# Patient Record
Sex: MALE | Race: WHITE | NOT HISPANIC OR LATINO
[De-identification: names, ages, dates, MRNs, and addresses within clinical notes are randomized per-mention and may not be internally consistent; named-entity substitution may affect disease eponyms.]

---

## 2024-09-18 ENCOUNTER — TRANSCRIBE ORDERS (OUTPATIENT)
Dept: OTHER | Age: 59
End: 2024-09-18

## 2024-09-18 DIAGNOSIS — R25.3 FASCICULATION: Primary | ICD-10-CM

## 2024-09-19 ENCOUNTER — TELEPHONE (OUTPATIENT)
Dept: NEUROLOGY | Facility: CLINIC | Age: 59
End: 2024-09-19

## 2024-09-19 NOTE — TELEPHONE ENCOUNTER
[facility] Call [facility]    Phone Message    May a detailed message be left on voicemail: yes     Reason for Call: Appointment Intake    Referring Provider Name: Quin Nicole MD  Diagnosis and/or Symptoms: Fasciculation [R25.3]    Please review and assist with scheduling- ALS Clinic-Patient does not have any active insurance on file, patient self pay, patient will have EMG completed on 9/20/24    Action Taken: Other: Neurology    Travel Screening: Not Applicable     Date of Service:

## 2024-09-20 ENCOUNTER — TRANSFERRED RECORDS (OUTPATIENT)
Dept: HEALTH INFORMATION MANAGEMENT | Facility: CLINIC | Age: 59
End: 2024-09-20

## 2024-10-25 DIAGNOSIS — M62.81 MUSCLE WEAKNESS (GENERALIZED): Primary | ICD-10-CM

## 2024-10-30 ENCOUNTER — OFFICE VISIT (OUTPATIENT)
Dept: PULMONOLOGY | Facility: CLINIC | Age: 59
End: 2024-10-30

## 2024-10-30 ENCOUNTER — OFFICE VISIT (OUTPATIENT)
Dept: NEUROLOGY | Facility: CLINIC | Age: 59
End: 2024-10-30

## 2024-10-30 VITALS
RESPIRATION RATE: 16 BRPM | SYSTOLIC BLOOD PRESSURE: 175 MMHG | WEIGHT: 207 LBS | HEART RATE: 66 BPM | OXYGEN SATURATION: 95 % | DIASTOLIC BLOOD PRESSURE: 94 MMHG

## 2024-10-30 DIAGNOSIS — M62.81 MUSCLE WEAKNESS (GENERALIZED): ICD-10-CM

## 2024-10-30 DIAGNOSIS — R26.1 SPASTIC GAIT: ICD-10-CM

## 2024-10-30 DIAGNOSIS — G12.21 ALS (AMYOTROPHIC LATERAL SCLEROSIS) (H): ICD-10-CM

## 2024-10-30 DIAGNOSIS — M62.81 MUSCLE WEAKNESS (GENERALIZED): Primary | ICD-10-CM

## 2024-10-30 LAB
EXPTIME-PRE: 7.67 SEC
FEF2575-%PRED-PRE: 148 %
FEF2575-PRE: 4.25 L/SEC
FEF2575-PRED: 2.86 L/SEC
FEFMAX-%PRED-PRE: 85 %
FEFMAX-PRE: 7.83 L/SEC
FEFMAX-PRED: 9.12 L/SEC
FEV1-%PRED-PRE: 118 %
FEV1-PRE: 3.91 L
FEV1FEV6-PRE: 82 %
FEV1FEV6-PRED: 79 %
FEV1FVC-PRE: 81 %
FEV1FVC-PRED: 79 %
FIFMAX-PRE: 4.75 L/SEC
FVC-%PRED-PRE: 115 %
FVC-PRE: 4.81 L
FVC-PRED: 4.18 L
MEP-PRE: 162 CMH2O
MIP-PRE: -133 CMH2O

## 2024-10-30 PROCEDURE — 94375 RESPIRATORY FLOW VOLUME LOOP: CPT | Performed by: INTERNAL MEDICINE

## 2024-10-30 PROCEDURE — 99417 PROLNG OP E/M EACH 15 MIN: CPT | Performed by: PSYCHIATRY & NEUROLOGY

## 2024-10-30 PROCEDURE — 99205 OFFICE O/P NEW HI 60 MIN: CPT | Performed by: PSYCHIATRY & NEUROLOGY

## 2024-10-30 PROCEDURE — 94799 UNLISTED PULMONARY SVC/PX: CPT | Performed by: INTERNAL MEDICINE

## 2024-10-30 RX ORDER — RILUZOLE 50 MG/1
1 TABLET, FILM COATED ORAL EVERY 12 HOURS
COMMUNITY
Start: 2024-10-03

## 2024-10-30 RX ORDER — LOSARTAN POTASSIUM AND HYDROCHLOROTHIAZIDE 12.5; 1 MG/1; MG/1
1 TABLET ORAL DAILY
COMMUNITY
Start: 2024-08-29

## 2024-10-30 RX ORDER — BUSPIRONE HYDROCHLORIDE 5 MG/1
1 TABLET ORAL 2 TIMES DAILY
COMMUNITY
Start: 2024-10-07

## 2024-10-30 RX ORDER — AMLODIPINE BESYLATE 5 MG/1
10 TABLET ORAL
COMMUNITY
Start: 2024-08-29

## 2024-10-30 RX ORDER — CITALOPRAM HYDROBROMIDE 40 MG/1
1 TABLET ORAL DAILY
COMMUNITY
Start: 2024-08-29

## 2024-10-30 RX ORDER — CARBAMAZEPINE 200 MG/1
200 TABLET ORAL
COMMUNITY
Start: 2024-10-03

## 2024-10-30 ASSESSMENT — REVISED AMYOTROPHIC LATERAL SCLEROSIS FUNCTIONAL RATING SCALE (ALSFRS-R)
DRESSING_AND_HYGEINE: 3
RESPIRATORY_SUBTOTAL_SCORE: 12
SALIVATION: NORMAL
SPEECH: 4
TURNING_IN_BED_AND_ADJUSTING_BED_CLOTHES: 3
BULBAR_SUBTOTAL: 12
CUTTING_FOOD_AND_HANDLING_UTENSILES: 4
SALIVATION: 4
HANDWRITING: 3
ORTHOPENA: 4
CLIMBING_STAIRS: 1
DRESSING_AND_HYGEINE: INDEPENDENT AND COMPLETE SELF-CARE WITH EFFORT OR DECREASED EFFICIENCY
WALKING: EARLY AMBULATION DIFFICULTIES
SPEECH: NORMAL SPEECH PROCESSES
HANDWRITING: SLOW OR SLOPPY: ALL WORDS ARE LEGIBLE
DYSPNEA: 4
SWALLOWING: NORMAL EATING HABITS
ALSFRS_TOTAL_SCORE: 41
SWALLOWING: 4
TURNING_IN_BED_AND_ADJUSTING_BED_CLOTHES: SOMEWHAT SLOW AND CLUMSY, BUT NO HELP NEEDED
GROSS_MOTOR_SUBTOTAL_SCORE: 7
FINE_MOTOR_SUBTOTAL_SCORE: 10
SIX_ITEM_SUBTOTAL: 20
RESPIRATORY_INSUFFICIENCY: 4
WALKING: 3
CLIMBING_STAIRS: NEEDS ASSISTANCE

## 2024-10-30 ASSESSMENT — PAIN SCALES - GENERAL: PAINLEVEL_OUTOF10: NO PAIN (0)

## 2024-10-30 NOTE — LETTER
"10/30/2024       RE: Burak Cruz  [address]  [facility] [address]     Dear Colleague,    Thank you for referring your patient, Burak Cruz, to the [facility] NEUROLOGY CLINIC [place] at [facility]. Please see a copy of my visit note below.    Omar Cruz is a 59 year old man who noted difficulty descending stairs last spring (perhaps April). He noted fasciculations in upper chest, back, and lower limbs about a year ago. His gait has worsened over time, and he has experienced falls without serious injury. DIfficulty with buttoning and opening things for about 6 months, both hands equally. Hands \"lock up\" with no past history of myotonia. No positive or negative sensory symptoms. No change in speech or swallowing. No definite cognitive or behavioral problems.    He is here today with his ex-wife, who provides collateral history. She feels his falls have been due to tripping over his toes, and she inquires about the utility of AFOs. She has not noticed cognitive, language, or behavioral disturbances. They both report that his mood is excellent; though he has been treated for depression in the past, he has had no psychiatric hospitalizations, suicide attempts, or current SI. Medical ROS is notable for hypertension only.     Non , nonsmoker, nondrinker, drinks about a pot of coffee per day. He reports exposure to 2,4D and other pesticides.     4 brothers  2 daughters    Mother alive  Father  in his 50s - \"drugs and alcohol;\" not familiar with his family history      Today's vital signs:    BP (!) 175/94   Pulse 66   Resp 16   Wt 93.9 kg (207 lb)   SpO2 95%      Today's neuromuscular examination:      NM Exam: Cranial        Atrophy Fasciculations   Upper face: Negative Negative   Lower face: Negative Negative   Tongue: Negative Negative      Facial weakness: no facial weakness  Tongue movements: normal  Tongue weakness: " none  Jaw jerk: absent  Speech: speech is normal  Pseudobulbar affect: absent  Comment: Equivocal slowing of speech       NM Exam: Axial    Neck flexion weakness: none  Neck extension weakness: none  Trunk LMN findings       NM Exam: Upper Extremities        Right Left   Atrophy: Positive Positive   Fasciculations: Positive Positive   Muscle tone: spastic catch spastic catch   Rapid alt. movements: normal normal     Reflexes Right Left   Biceps: increased increased   Brachioradialis: increased increased   Triceps: increased increased   Golden: right Golden reflex present left Golden reflex present     Strength Right Left   * Indicates a recommended field     Shoulder abduction*: 5 5   Elbow flexion: 5 5   Elbow extension*: 5 5   Wrist extension*: 5 5   Finger extension: 5 5   Finger flexion: 5 5   Abductor pollicis brevis: 4 4   First dorsal interosseous*: 4 4+   Abductor digiti minimi: 5    Comment:       NM Exam: Lower Extremities        Right Left   Atrophy: Negative Negative   Fasciculations: Negative Negative   Muscle tone: spastic catch spastic catch   Rapid alt. movements: slow slow     Reflexes Right Left   Patellar: increased increased   Achilles: increased increased   Plantar: mute mute     Strength Right Left   * Indicates a recommended field     Hip flexion*: 5 5   Hip extension: 5 5   Hip adduction: 5 5   Hip abduction: 5 5   Knee extension*: 5 5   Knee flexion: 5 5   Ankle dorsiflexion*: 5 5   Ankle plantar flexion: 5 5   Ankle inversion:     Ankle eversion:     Toe extension:     Toe flexion:       Normal gait: no   Able to stand on heels: yes   Able to stand on toes: yes   Comment:  Stiff, awkward gait without luz scissoring. Heel, toe walking normal. Tandem difficult, pull test negative, Romberg negative.            Today's functional outcome measures and pulmonary function studies:    ALSFRS-R Total Score  41; delta FRS 7/18    FVC    Lab Results   Component Value Date    20002 4.81 10/30/2024         I personally reviewed EMG, laboratory studies, and imaging of brain, cervical spine, and thoracic spine. Of note, a precentral SWI stripe was seen on MRI. EMG - limited muscle screen but fulfills Awaji criteria in cervical segment.     Patient instructions:  I agree with the diagnosis of ALS. We discussed the diagnostic process and management today. Recommendations are as follows:    Start riluzole.  I will make a referral to our orthotist.  We agreed that I will refer you to our genetic counselor, , and ALS Association representative.  Return visit to our ALS multidisciplinary clinic, where you will meet with our physical therapist, speech therapist, occupational therapist, research coordinator, nurse.  Stretching is your best bet for the stiffness for now; you can increase Tegretol slowly if you choose.  Labs now to monitor for safety on Tegretol, then again after you have been on Riluzole for a month.       Frank Nesbitt M.D.      94 minutes spent on the date of the encounter on chart review, history and examination, documentation and further activities as noted above.     The longitudinal plan of care for ALS as documented were addressed during this visit. Due to the added complexity in care, I will continue to support Burak in the subsequent management and with ongoing continuity of care.                  Again, thank you for allowing me to participate in the care of your patient.      Sincerely,    Frank Nesbitt MD

## 2024-10-30 NOTE — Clinical Note
1. Arrange meet the team visit, within 3-4 weeks if possible 2. Fax lab orders to PCP, to be done within 1-2 weeks 3. Notify [facility] of patient's name and contact info to register (patient consented verbally) 4. Ask Danica Mendoza to call - patient has questions about SSDI  Thanks!

## 2024-10-30 NOTE — PROGRESS NOTES
Burak Cruz comes into clinic today at the request of Dr Nesbitt , for spirometry  and MIPs and MEPs    Tolerated testing well. No adverse reactions. Left lab in no distress.        ANASTACIA RHOADES

## 2024-10-30 NOTE — PATIENT INSTRUCTIONS
I agree with the diagnosis of ALS. We discussed the diagnostic process and management today. Recommendations are as follows:    Start riluzole.  I will make a referral to our orthotist.  We agreed that I will refer you to our genetic counselor, , and ALS Association representative.  Return visit to our ALS multidisciplinary clinic, where you will meet with our physical therapist, speech therapist, occupational therapist, research coordinator, nurse.  Stretching is your best bet for the stiffness for now; you can increase Tegretol slowly if you choose.  Labs now to monitor for safety on Tegretol, then again after you have been on Riluzole for a month.     Please call [facility] @ [phone] for questions or concerns during regular business hours. For a more efficient way to communicate, use [employer] and address the message to your physician. Remember, [employer] is only read during business hours. Do not leave urgent messages on voicemail or [employer]. If situation is urgent, contact the Neurology Clinic @ [phone] and ask to speak to a Triage Nurse or Call 911 or visit an Emergency Department.     Please call your pharmacy if you need a medication refill. They will send us an electronic message.

## 2024-11-01 ENCOUNTER — PATIENT OUTREACH (OUTPATIENT)
Dept: CARE COORDINATION | Facility: CLINIC | Age: 59
End: 2024-11-01

## 2024-11-01 DIAGNOSIS — Z71.89 COUNSELING AND COORDINATION OF CARE: Primary | ICD-10-CM

## 2024-11-01 NOTE — PROGRESS NOTES
"Social Work -ALS Progress Note  [facility]    Data/Intervention:    Patient Name:  Burak Cruz  /Age:  1965 (59 year old)    Visit Type: telephone    Collaborated With:    -Omar  -Omar's x-wife Rodrigo Cruz [phone]  -Dr Nesbitt     Psychosocial info/Concerns:   Request to contact Pt with new ALS dx prior to appt with team. Omar lives alone in his own home and has his own [employer] company. He reports he is still working, just part time. He is uninsured. He is able to manage on his own despite his advancing mobility issues. He uses the railings to climb his stairs. He wants to continue to work for as long as he can. He is wondering about how to get Medicare coverage.   His support system includes his x wife rodrigo who lives nearby, their 2 dtrs Chanel and her sons ages 6,9 and Adelaida who also live nearby. He also has a very close friend that is visiting him daily and helping him with repairs to his house.     Omar has known for a few months about having ALS. He indicated that he feels like people around him are more upset than he is about it. He also said \"life doesn't owe me anything at age 59\" He expressed that he has lived a good life.   He feels gratitude for those supporting him.     Intervention/Education/Resources Provided:  We reviewed his work situation and discussed that he has to apply for SSDI in order to get medicare and he needs to be working part time which he indicated he is and make less than $1550/month which he isn't. We discussed that it's open enrollment and advised that he [place] thru [place] that [place] start  to have until medicare starts. He asked me to call Rodrigo to discuss this as she is \"smarter than I am about this\".   I did contact Rodrigo and we discussed all of the options. She plans to help him apply for insurance thru [employer]. She believes that he will not be able to work much longer and that it's hard for him to " accept that right now. Disc SSDI application and medicare and suggested considering a medigap plan. Amita also indicated that her brother in law has ALS and so she has some experience with this. Lastly, I discussed with Amita applying for the [employer] Financial Assistance program for his current medical bills.   Omar would like the ALS assoc to reach out to him so I put in that request and they will contact him in a few days to get him registered.     Assessment/Plan:  Pt working on accepting his new realty. He uses humor a lot to cope. He has good support.   Insurance likely won't start until Jan as he currently isn't eligible based on his [place] for any [place] plans but may be soon. Encouraged them to reach out to me anytime as needed.     Provided patient/family with contact information and encouraged them to contact me between clinic visits as needed.     DANIEL Wright, [facility]    [facility]  [phone]

## 2024-11-13 DIAGNOSIS — G12.21 ALS (AMYOTROPHIC LATERAL SCLEROSIS) (H): Primary | ICD-10-CM

## 2024-11-14 ENCOUNTER — OFFICE VISIT (OUTPATIENT)
Dept: NEUROLOGY | Facility: CLINIC | Age: 59
End: 2024-11-14

## 2024-11-14 ENCOUNTER — VIRTUAL VISIT (OUTPATIENT)
Dept: CONSULT | Facility: CLINIC | Age: 59
End: 2024-11-14
Attending: PSYCHIATRY & NEUROLOGY

## 2024-11-14 ENCOUNTER — PATIENT OUTREACH (OUTPATIENT)
Dept: CARE COORDINATION | Facility: CLINIC | Age: 59
End: 2024-11-14

## 2024-11-14 ENCOUNTER — THERAPY VISIT (OUTPATIENT)
Dept: PHYSICAL THERAPY | Facility: CLINIC | Age: 59
End: 2024-11-14

## 2024-11-14 ENCOUNTER — ALLIED HEALTH/NURSE VISIT (OUTPATIENT)
Dept: NEUROLOGY | Facility: CLINIC | Age: 59
End: 2024-11-14

## 2024-11-14 ENCOUNTER — LAB (OUTPATIENT)
Dept: LAB | Facility: CLINIC | Age: 59
End: 2024-11-14

## 2024-11-14 VITALS
HEIGHT: 69 IN | DIASTOLIC BLOOD PRESSURE: 106 MMHG | OXYGEN SATURATION: 97 % | SYSTOLIC BLOOD PRESSURE: 163 MMHG | RESPIRATION RATE: 16 BRPM | HEART RATE: 78 BPM | WEIGHT: 207.5 LBS | BODY MASS INDEX: 30.73 KG/M2

## 2024-11-14 DIAGNOSIS — M62.81 MUSCLE WEAKNESS (GENERALIZED): ICD-10-CM

## 2024-11-14 DIAGNOSIS — G12.21 ALS (AMYOTROPHIC LATERAL SCLEROSIS) (H): ICD-10-CM

## 2024-11-14 DIAGNOSIS — G12.21 ALS (AMYOTROPHIC LATERAL SCLEROSIS) (H): Primary | ICD-10-CM

## 2024-11-14 DIAGNOSIS — Z71.83 ENCOUNTER FOR NONPROCREATIVE GENETIC COUNSELING: Primary | ICD-10-CM

## 2024-11-14 LAB
ALBUMIN SERPL BCG-MCNC: 4.5 G/DL (ref 3.5–5.2)
ALP SERPL-CCNC: 70 U/L (ref 40–150)
ALT SERPL W P-5'-P-CCNC: 33 U/L (ref 0–70)
ANION GAP SERPL CALCULATED.3IONS-SCNC: 10 MMOL/L (ref 7–15)
AST SERPL W P-5'-P-CCNC: 28 U/L (ref 0–45)
BASOPHILS # BLD AUTO: 0.1 10E3/UL (ref 0–0.2)
BASOPHILS NFR BLD AUTO: 1 %
BILIRUB SERPL-MCNC: 0.3 MG/DL
BUN SERPL-MCNC: 10.6 MG/DL (ref 6–20)
CALCIUM SERPL-MCNC: 9.7 MG/DL (ref 8.8–10.4)
CHLORIDE SERPL-SCNC: 99 MMOL/L (ref 98–107)
CREAT SERPL-MCNC: 0.91 MG/DL (ref 0.67–1.17)
EGFRCR SERPLBLD CKD-EPI 2021: >90 ML/MIN/1.73M2
EOSINOPHIL # BLD AUTO: 0.2 10E3/UL (ref 0–0.7)
EOSINOPHIL NFR BLD AUTO: 3 %
ERYTHROCYTE [DISTWIDTH] IN BLOOD BY AUTOMATED COUNT: 13.6 % (ref 10–15)
GLUCOSE SERPL-MCNC: 129 MG/DL (ref 70–99)
HCO3 SERPL-SCNC: 30 MMOL/L (ref 22–29)
HCT VFR BLD AUTO: 44.9 % (ref 40–53)
HGB BLD-MCNC: 15.4 G/DL (ref 13.3–17.7)
IMM GRANULOCYTES # BLD: 0 10E3/UL
IMM GRANULOCYTES NFR BLD: 0 %
LYMPHOCYTES # BLD AUTO: 1.6 10E3/UL (ref 0.8–5.3)
LYMPHOCYTES NFR BLD AUTO: 20 %
MCH RBC QN AUTO: 29.2 PG (ref 26.5–33)
MCHC RBC AUTO-ENTMCNC: 34.3 G/DL (ref 31.5–36.5)
MCV RBC AUTO: 85 FL (ref 78–100)
MONOCYTES # BLD AUTO: 0.6 10E3/UL (ref 0–1.3)
MONOCYTES NFR BLD AUTO: 7 %
NEUTROPHILS # BLD AUTO: 5.5 10E3/UL (ref 1.6–8.3)
NEUTROPHILS NFR BLD AUTO: 69 %
NRBC # BLD AUTO: 0 10E3/UL
NRBC BLD AUTO-RTO: 0 /100
PLATELET # BLD AUTO: 208 10E3/UL (ref 150–450)
POTASSIUM SERPL-SCNC: 3.8 MMOL/L (ref 3.4–5.3)
PROT SERPL-MCNC: 7 G/DL (ref 6.4–8.3)
RBC # BLD AUTO: 5.28 10E6/UL (ref 4.4–5.9)
SODIUM SERPL-SCNC: 139 MMOL/L (ref 135–145)
WBC # BLD AUTO: 7.9 10E3/UL (ref 4–11)

## 2024-11-14 PROCEDURE — 96040 HC GENETIC COUNSELING, EACH 30 MINUTES: CPT | Mod: GT,95 | Performed by: GENETIC COUNSELOR, MS

## 2024-11-14 PROCEDURE — 85025 COMPLETE CBC W/AUTO DIFF WBC: CPT | Performed by: PATHOLOGY

## 2024-11-14 PROCEDURE — 80053 COMPREHEN METABOLIC PANEL: CPT | Performed by: PATHOLOGY

## 2024-11-14 PROCEDURE — 99000 SPECIMEN HANDLING OFFICE-LAB: CPT | Performed by: PATHOLOGY

## 2024-11-14 PROCEDURE — 36415 COLL VENOUS BLD VENIPUNCTURE: CPT | Performed by: PATHOLOGY

## 2024-11-14 ASSESSMENT — PAIN SCALES - GENERAL: PAINLEVEL_OUTOF10: NO PAIN (0)

## 2024-11-14 NOTE — PROGRESS NOTES
Burak Cruz was seen for a genetic counseling appointment at the request of Dr. Nesbitt today given his diagnosis of ALS. He was accompanied by his ex wife Amita.    Pertinent Medical History: Burak is a 59 year old male with a history of limb onset ALS. See Dr. Nesbitt's note for additional details.     Family History: A three generation pedigree was obtained today and scanned into the EMR. This family history is by patient report only and has not been verified with medical records except where noted. The following information is significant:   Children-   Daughter (age 37) is alive and well and has two healthy sons (ages 10 and 6)  Daughter (age 30) is alive and well  Siblings-   Full brother (age 62) who has heart and lung problems and has a history of drugs and alcohol use. No information is available regarding his son.  Full brother (age 60) is alive and well. He has two twin daughters with autism  Maternal half brother (age 45) is alive and well. He has two healthy sons.  Maternal half brother (age 57) is alive and well  Parents-   Father  due to drug and alcohol use. No information is available regarding his health history.   Mother (age 78) has heart problems.  Maternal Relatives-   Aunts, uncles and cousins have no history of neurologic concerns  Grandmother  at age 95  Grandfather  at age 60 due to a heart attack. He had a history of smoking, TB and emphysema.  Paternal Relatives-   No information is available regarding paternal family members.     Family history is otherwise largely non-contributory.     Discussion: Amyotrophic lateral sclerosis (ALS) is a condition that affects the motor neurons.  Motor neurons are responsible for sending the necessary signals to the muscles, to allow for movement and speech. In ALS, these motor neurons break down and eventually lead to loss of speech and paralysis. Some individuals with ALS may also experience neuropsychological symptoms such as cognitive  "and/or behavioral dysfunction or frontotemporal dementia (FTD) in severe cases. The progression of this condition is approximately three to five years, although it can vary with age of onset and between and within families. Historically, ALS has been categorized into \"familial ALS\" when an individual has two or more close relatives with ALS and \"sporadic ALS\" when an individual with ALS has no family history of the condition. ALS is not generally thought of as a genetic or inherited condition; however recently several genetic factors have been found to be either causative or contributory. When a family history of ALS is identified then we have a strong suspicion that there is a causative genetic factor or genetic change (mutation) that predisposes members of the family to ALS. However, as more research and genetic testing has become available, individuals with seemingly sporadic ALS can have a gene mutation identified. This may be due to a new (de ruslan) mutation in that individual or reduced penetrance. Therefore, a negative family history cannot rule out a possible genetic form of ALS. About 10-15% of individuals with ALS are thought to have \"genetic ALS\" meaning a causative gene mutation has been identified.     There are several genes that have been found to be associated with ALS and/or FTD. The most common genetic cause of ALS is a hexanucleotide repeat expansion (GGGGCC) in the D8sct62 gene. The number of hexanucleotide repeats in the A3zvo34 gene ranges from 2 to >4000. The precise cutoff of between normal and pathogenic (disease causing) is complicated by multiple factors, but generally <25 is considered normal and >60 is considered pathogenic. Repeat expansions in the J4mec26 gene account for approximately 39-45% of familial cases of ALS and about 3-7% of sporadic ALS. Other genes known to cause ALS include: SOD1, FUS, TARDBP, ANG, OPTN, FIG4, VAPB, UBQLN2, SETX, NEK1, VCP, ALS2, TDP43 (CHCHD10, DCTN1, " MATR3, PFN1, TUBA4A, UNC13A, ATXN2).    Genetic forms of ALS are typically inherited in an autosomal dominant pattern, meaning a change on one copy of the gene is sufficient to predispose an individual to the condition. Someone who possesses an ALS gene mutation would have a 1 in 2 (50%) chance of passing the gene mutation associated with ALS on to their children. It is import to know that not all people who inherit an ALS gene mutation will develop ALS. This is called reduced penetrance. For example, the penetrance or percent of individuals who develop ALS who have a mutation in SOD1 ranges from 50% to 90% by age 70, depending on the specific genetic mutation. It is assumed that other genes will also show reduced penetrance. We discussed that if the gene associated with ALS is NOT passed on; that child is NOT at an increased risk to develop symptoms because of this genetic change and CANNOT pass it on to their children. The only way to know if the genetic change is passed on is by genetic testing.    We discussed the availability of genetic testing for ALS. The gold standard of ALS genetic testing is to begin testing the F7hzb34 gene to determine if an individual has a normal number of hexanucleotide repeats (<25) or an expanded number of hexanucleotide repeats (>60). If that testing identifies normal repeats on both copies of A7yzx47, then testing can be expanded to a multi-gene panel to include the other genes associated with ALS. We went on to discuss the details, limitations, and possible outcomes of these multi-gene panels. In particular, we discussed that there are three possible results:  Negative: meaning normal or no mutations are identified in the genes that were tested/sequenced  Positive: meaning a mutation that is known to be associated with a particular set of symptoms is identified  Variant of uncertain significance (VUS): meaning a change in the DNA sequence of a particular gene was seen but there  is not enough information or data yet to know if it explains the symptoms. If a VUS is identified, testing of other relatives may be helpful to provide clarification.  In most cases, identification of a VUS does not confirm a diagnosis and does not result in any clinically actionable recommendations.    Even with the growing numbers of genes that have been identified, current clinically available genetic testing identifies a causative mutation in less than 40% families affected with familial ALS. Therefore, most of the time we cannot identify the genetic cause in individuals with ALS. If the results of genetic testing are negative, this cannot rule out the possibility that there may be an underlying genetic cause or component to an individual's ALS, as it is likely that the genetic cause of all forms of ALS have yet to be discovered. DNA banking is the process in which we can store an individual's DNA almost indefinitely to test at a later time when new genetic tests are available. Additionally, the DNA could be used in research with the participant's consent. This can be done for any genetic condition. It can also be done for conditions which are not thought to be genetic or where there isn't a known family history. Saving the sample may allow for testing as advances are made in diagnosis and treatment.  DNA banking is available through many laboratories.    Pre-symptomatic testing is an option ONLY if a familial mutation is identified in and affected individual. Pre-symptomatic testing is an individual choice. Some people prefer to know so they can be aware of any symptoms and seek appropriate medical care and for family planning.  Other people find it difficult to live in anticipation of the condition and prefer not to know.  Future insurability is often a question for individuals undergoing pre-symptomatic testing.  While there are federal and state laws that protect people from health insurance discrimination,  there is not as much regulation on life and disability insurance.  There are many things to consider when contemplating pre-symptomatic testing genetic counseling as well taking to a psychologist can help in the decision making process. As treatments for ALS are developed pre-symptomatic testing may be an avenue to detect those individuals at risk to develop ALS and provide preventative treatment.     We discussed the option to complete genetic testing via a sponsored option or self pay pricing. Risks, benefits and limitations of these options were reviewed. Burak expressed an excellent understanding of this information and consented to the sponsored ALS panel.    Plan:  1. Sponsored ALS panel with D2ybs65 and ATXN2 repeat analysis at [facility]   2. Return pending results of above testing  3. Contact information was provided should any questions arise in the future.     Brenda Galvan MS [facility]  Genetic Counselor  Division of Genetics and Metabolism  (p) [phone]  (f) [phone]     Total time spent in consultation with the family was approximately 19 minutes      Cc: No Letter

## 2024-11-14 NOTE — PATIENT INSTRUCTIONS
We discussed the rationale for starting Riluzole  Please check your blood pressure and follow up with Tayler Carr if it remains higher than 130/70  Go to the lab when we are done for your blood tests  Return visit 12:00 January 9  Follow PT recommendations as well.        Please call [facility] @ [phone] for questions or concerns during regular business hours. For a more efficient way to communicate, use [employer] and address the message to your physician. Remember, [employer] is only read during business hours. Do not leave urgent messages on [employer] or Clinicbook. If situation is urgent, contact the Neurology Clinic @ [phone] and ask to speak to a Triage Nurse or Call 911 or visit an Emergency Department.     Please call your pharmacy if you need a medication refill. They will send us an electronic message.

## 2024-11-14 NOTE — LETTER
2024      RE: Burak Cruz  [address]  [facility] [address]     Dear Colleague,    Thank you for the opportunity to participate in the care of your patient, Burak Cruz, at the [facility] at [facility]. Please see a copy of my visit note below.    Burak Cruz was seen for a genetic counseling appointment at the request of Dr. Nesbitt today given his diagnosis of ALS. He was accompanied by his ex wife Amita.    Pertinent Medical History: Burak is a 59 year old male with a history of limb onset ALS. See Dr. Nesbitt's note for additional details.     Family History: A three generation pedigree was obtained today and scanned into the EMR. This family history is by patient report only and has not been verified with medical records except where noted. The following information is significant:   Children-   Daughter (age 37) is alive and well and has two healthy sons (ages 10 and 6)  Daughter (age 30) is alive and well  Siblings-   Full brother (age 62) who has heart and lung problems and has a history of drugs and alcohol use. No information is available regarding his son.  Full brother (age 60) is alive and well. He has two twin daughters with autism  Maternal half brother (age 45) is alive and well. He has two healthy sons.  Maternal half brother (age 57) is alive and well  Parents-   Father  due to drug and alcohol use. No information is available regarding his health history.   Mother (age 78) has heart problems.  Maternal Relatives-   Aunts, uncles and cousins have no history of neurologic concerns  Grandmother  at age 95  Grandfather  at age 60 due to a heart attack. He had a history of smoking, TB and emphysema.  Paternal Relatives-   No information is available regarding paternal family members.     Family history is otherwise largely non-contributory.     Discussion: Amyotrophic  "lateral sclerosis (ALS) is a condition that affects the motor neurons.  Motor neurons are responsible for sending the necessary signals to the muscles, to allow for movement and speech. In ALS, these motor neurons break down and eventually lead to loss of speech and paralysis. Some individuals with ALS may also experience neuropsychological symptoms such as cognitive and/or behavioral dysfunction or frontotemporal dementia (FTD) in severe cases. The progression of this condition is approximately three to five years, although it can vary with age of onset and between and within families. Historically, ALS has been categorized into \"familial ALS\" when an individual has two or more close relatives with ALS and \"sporadic ALS\" when an individual with ALS has no family history of the condition. ALS is not generally thought of as a genetic or inherited condition; however recently several genetic factors have been found to be either causative or contributory. When a family history of ALS is identified then we have a strong suspicion that there is a causative genetic factor or genetic change (mutation) that predisposes members of the family to ALS. However, as more research and genetic testing has become available, individuals with seemingly sporadic ALS can have a gene mutation identified. This may be due to a new (de ruslan) mutation in that individual or reduced penetrance. Therefore, a negative family history cannot rule out a possible genetic form of ALS. About 10-15% of individuals with ALS are thought to have \"genetic ALS\" meaning a causative gene mutation has been identified.     There are several genes that have been found to be associated with ALS and/or FTD. The most common genetic cause of ALS is a hexanucleotide repeat expansion (GGGGCC) in the D6ihj62 gene. The number of hexanucleotide repeats in the Q2txe23 gene ranges from 2 to >4000. The precise cutoff of between normal and pathogenic (disease causing) is " complicated by multiple factors, but generally <25 is considered normal and >60 is considered pathogenic. Repeat expansions in the K2bim56 gene account for approximately 39-45% of familial cases of ALS and about 3-7% of sporadic ALS. Other genes known to cause ALS include: SOD1, FUS, TARDBP, ANG, OPTN, FIG4, VAPB, UBQLN2, SETX, NEK1, VCP, ALS2, TDP43 (CHCHD10, DCTN1, MATR3, PFN1, TUBA4A, UNC13A, ATXN2).    Genetic forms of ALS are typically inherited in an autosomal dominant pattern, meaning a change on one copy of the gene is sufficient to predispose an individual to the condition. Someone who possesses an ALS gene mutation would have a 1 in 2 (50%) chance of passing the gene mutation associated with ALS on to their children. It is import to know that not all people who inherit an ALS gene mutation will develop ALS. This is called reduced penetrance. For example, the penetrance or percent of individuals who develop ALS who have a mutation in SOD1 ranges from 50% to 90% by age 70, depending on the specific genetic mutation. It is assumed that other genes will also show reduced penetrance. We discussed that if the gene associated with ALS is NOT passed on; that child is NOT at an increased risk to develop symptoms because of this genetic change and CANNOT pass it on to their children. The only way to know if the genetic change is passed on is by genetic testing.    We discussed the availability of genetic testing for ALS. The gold standard of ALS genetic testing is to begin testing the E4zlg55 gene to determine if an individual has a normal number of hexanucleotide repeats (<25) or an expanded number of hexanucleotide repeats (>60). If that testing identifies normal repeats on both copies of A8esf95, then testing can be expanded to a multi-gene panel to include the other genes associated with ALS. We went on to discuss the details, limitations, and possible outcomes of these multi-gene panels. In particular, we  discussed that there are three possible results:  Negative: meaning normal or no mutations are identified in the genes that were tested/sequenced  Positive: meaning a mutation that is known to be associated with a particular set of symptoms is identified  Variant of uncertain significance (VUS): meaning a change in the DNA sequence of a particular gene was seen but there is not enough information or data yet to know if it explains the symptoms. If a VUS is identified, testing of other relatives may be helpful to provide clarification.  In most cases, identification of a VUS does not confirm a diagnosis and does not result in any clinically actionable recommendations.    Even with the growing numbers of genes that have been identified, current clinically available genetic testing identifies a causative mutation in less than 40% families affected with familial ALS. Therefore, most of the time we cannot identify the genetic cause in individuals with ALS. If the results of genetic testing are negative, this cannot rule out the possibility that there may be an underlying genetic cause or component to an individual's ALS, as it is likely that the genetic cause of all forms of ALS have yet to be discovered. DNA banking is the process in which we can store an individual's DNA almost indefinitely to test at a later time when new genetic tests are available. Additionally, the DNA could be used in research with the participant's consent. This can be done for any genetic condition. It can also be done for conditions which are not thought to be genetic or where there isn't a known family history. Saving the sample may allow for testing as advances are made in diagnosis and treatment.  DNA banking is available through many laboratories.    Pre-symptomatic testing is an option ONLY if a familial mutation is identified in and affected individual. Pre-symptomatic testing is an individual choice. Some people prefer to know so they  can be aware of any symptoms and seek appropriate medical care and for family planning.  Other people find it difficult to live in anticipation of the condition and prefer not to know.  Future insurability is often a question for individuals undergoing pre-symptomatic testing.  While there are federal and state laws that protect people from health insurance discrimination, there is not as much regulation on life and disability insurance.  There are many things to consider when contemplating pre-symptomatic testing genetic counseling as well taking to a psychologist can help in the decision making process. As treatments for ALS are developed pre-symptomatic testing may be an avenue to detect those individuals at risk to develop ALS and provide preventative treatment.     We discussed the option to complete genetic testing via a sponsored option or self pay pricing. Risks, benefits and limitations of these options were reviewed. Burak expressed an excellent understanding of this information and consented to the sponsored ALS panel.    Plan:  1. Sponsored ALS panel with S6yzh25 and ATXN2 repeat analysis at [facility]   2. Return pending results of above testing  3. Contact information was provided should any questions arise in the future.     Brenda Galvan [facility]  Genetic Counselor  Division of Genetics and Metabolism  p) [phone]  (f) [phone]     Total time spent in consultation with the family was approximately 19 minutes      Cc: No Letter       Please do not hesitate to contact me if you have any questions/concerns.     Sincerely,       Brenda Galvan GC

## 2024-11-14 NOTE — LETTER
11/14/2024       RE: Burak Cruz  [address]  [facility] [address]     Dear Colleague,    Thank you for referring your patient, Burak Cruz, to the [facility] [place] at [facility]. Please see a copy of my visit note below.    Return visit for 59 year old year old male with ALS. He presents today after initial diagnostic visit to meet with our multidisciplinary team.    Today's neuromuscular examination (abbreviated):            NM Exam: Upper Extremities        Right Left   Atrophy:     Fasciculations:     Muscle tone:     Rapid alt. movements:       Reflexes Right Left   Biceps: increased increased   Brachioradialis:     Triceps:     Golden:       Strength Right Left   * Indicates a recommended field     Shoulder abduction*: 5 5   Elbow flexion:     Elbow extension*:     Wrist extension*:     Finger extension: 4 4   Finger flexion:     Abductor pollicis brevis:     First dorsal interosseous*:     Abductor digiti minimi:     Comment:       NM Exam: Lower Extremities        Right Left   Atrophy:     Fasciculations:     Muscle tone:     Rapid alt. movements:       Reflexes Right Left   Patellar:     Achilles:     Plantar:       Strength Right Left   * Indicates a recommended field     Hip flexion*: 4+ 4+   Hip extension:     Hip adduction:     Hip abduction:     Knee extension*:     Knee flexion:     Ankle dorsiflexion*:     Ankle plantar flexion:     Ankle inversion:     Ankle eversion:     Toe extension:     Toe flexion:       Normal gait: no   Comment:                   Today's functional outcome measures and pulmonary function studies:    ALSFRS-R Total Score  41    FVC    Lab Results   Component Value Date    20002 4.81 10/30/2024          Summary of today's multidisciplinary clinic visit:    ALS disease modifying medications: has not started riluzole; declines radicava for now  Mobility: see PT note  ADLs: see OT  "note  Ventilation: normal PFT  Communication: no concerns at present but will check speaking rate  Swallowing and nutrition: no concerns at present  Secretion and saliva management: no concerns  ALS research: met today; candidate for interventional and observational studies; enrolled in ALS Natural History and given COMBAT ICF   Genetic testing: done today  Advance care planning:  Cognitive symptoms: none  Behavioral health concerns: discussed \"def[place]\" but open, no concerns  Pain: no concerns    Patient instructions:  We discussed the rationale for starting Riluzole  Please check your blood pressure and follow up with Tayler Carr if it remains higher than 130/70  Go to the lab when we are done for your blood tests  Return visit 12:00 January 9  PT recommendations:       Frank Nesbitt M.D.      50 minutes spent on the date of the encounter on chart review, history and examination, documentation and further activities as noted above.     The longitudinal plan of care for ALS as documented were addressed during this visit. Due to the added complexity in care, I will continue to support Burak in the subsequent management and with ongoing continuity of care.            Again, thank you for allowing me to participate in the care of your patient.      Sincerely,    Frank Nesbitt MD    "

## 2024-11-14 NOTE — PATIENT INSTRUCTIONS
Exercise recommendations with ALS    Resource for exercise recommendations: https://www.als.org/navigating-als/living-with-als/therapies-care/how-to-improve-mobility    If you are new to a form of exercise it is important to start SLOWLY and see how your body responds. Please see the directions below on how to safely exercise with ALS.    Stretching  Maintaining range of motion is important to reduce pain and tightness in weakened muscles. This should be done daily.    Your physical therapist or occupational therapist will identify a few key stretches for you to do. Each stretch should be held for 30 seconds without bouncing, 2-3 repetitions.    Cardiovascular training  Your physical therapist will help you identify the best mode of exercise for you to perform. This may change over time, so continue to follow recommendations from your therapists.    A good starting point is usually 10 mins at a slow, comfortable pace. Allow a break day in between.   - If you are more fatigued or sore, then it was too much. You need to wait until your symptoms of over-use resolve before trying again. Next time try only 5 mins and see how you feel. If symptoms persist, then likely your body won't tolerate cardiovascular training.  - If you are feeling ok the next day after doing 10 mins of exercise, then you can try it again. Repeat your exercise 3 times before increasing the time by no more than 5 mins.  - Continue to assess your symptoms along the way. If you have increased fatigue or soreness, then you will need to back up to the last level.  - The goal of cardiovascular training is continuous movement. Don't worry about increasing speed, incline, or resistance.   - Every day may be a little different depending on your fatigue and other activities you have been doing. Listen to your body and gauge your exercise routine accordingly.    Strength  Strength training of muscles that are weakened is to be avoided, as it will just over  strain them and there is potential to increase the progression of weakness in these muscles.    If you choose to strength train your non-affected  muscles, it is recommended to use a low level of weight with higher reps (10-20 reps). Make sure you have at least 1 rest day in between strength training sessions. If you notice increase in fatigue or soreness, then what you did was too much, and you should discontinue exercising those muscles.             We discussed some adaptive equipment. Let me know if you are in need of anything! Button hooks, zipper pulls can help with getting dressed. Tools to open jars. Foam pieces that you can put over pens and utensils to provide a wider .   Mobility equipment recommended: shower chair, cane or trekking poles for walking longer distances.  I would recommend sleeping with a pillow between your knees to provide better spine/hip alignment.  Stretch daily (see pictures)

## 2024-11-14 NOTE — PROGRESS NOTES
PHYSICAL THERAPY EVALUATION  Type of Visit: Evaluation       Fall Risk Screen:  Fall screen completed by: PT  Have you fallen 2 or more times in the past year?: Yes  Have you fallen and had an injury in the past year?: No  Timed Up and Go score (seconds): >13.5 sec  Is patient a fall risk?: Yes; Department fall risk interventions implemented    Subjective         Presenting condition or subjective complaint:   Has been tripping frequently, but recently has been able to compensate so hasn't fallen for a couple weeks. No injuries from prior falls.   Cold makes his legs feel more stiff.   He started medication that has been helping his cramps a lot. He only has cramps randomly now.  He is having increased difficulty using his hands for opening jars, writing, buttoning.   Date of onset: 11/14/24    Relevant medical history:     Dates & types of surgery:      Prior diagnostic imaging/testing results:       Prior therapy history for the same diagnosis, illness or injury:        Prior Level of Function  Transfers: Independent  Ambulation: Independent  ADL: Independent  IADL: Driving, Housekeeping, Laundry, Meal preparation    Living Environment  Social support:   lives alone  Type of home:   split-level house. All needs can be met on one level other than laundry. Bathroom has tub shower combo.   Stairs to enter the home:       14 stairs from the garage with rail  Ramp:     Stairs inside the home:       2 sets of 7 stairs with 1 rail.   Help at home:    Equipment owned:   cane (doesn't use)    Employment:     owns a lawn business  Hobbies/Interests:       Patient goals for therapy:   Get all the information he can    Pain assessment: Pain denied     Objective    NEURO CLINIC EVALUATION    Others present at visit: ex-wife, Etta    Cardio-respiratory status: Forced vital capacity: 115 % of predicted     Height/Weight: 5'9/ 207 lbs    Technology used: smart phone    ALS FRS:  41/48    Speaking Rate: 188 words/minute    10  meter: 5.47 sec    Evaluation   Interview completed.     Range of motion: Tightness noted in heelcords, hamstrings, hip adductors, and hip internal rotators. Spastic catch with knee extension.       Manual muscle testing:  Ankle DF strength 5/5, knee ext 5/5, hip flexion 4+/5.      Gait:  Patient ambulates with no assistive device with modified independence. Spastic gait, more notable when walking quickly. No instances of toe catching today, however patient/family report that he is tripping/catching toes at home.       5 Time sit to stand: 18.23 sec     Cognition:  Intact    Recommended Interventions: ADL/Self Care/Home Management    Treatment provided this date:   ADL/Self Care/Home Management: 17 minutes    -Educated patient on energy conservation techniques including pacing of activities, frequent rest breaks, use of assistive devices and monitoring signs of fatigue (increased muscle soreness, weakness, cramps, fasciculations) for safe functional mobility and performance of daily tasks.  -Educated patient on exercise recommendations including focus on stretching and light cardiovascular conditioning as main modes. Educated on safe strengthening principles including endurance focus for muscle groups that have anti gravity strength. Educated patient to monitor signs for red flags after exercise (increased weakness, cramping, fasciculations) that last >30 minutes. Instructed in completing daily stretches for heelcords, hamstrings, and hip internal rotators  -Educated patient on fall risk reduction techniques including energy conservation techniques, monitoring signs of fatigue, performance of single tasks with rest breaks in between activities and use of assistive devices (cane or trekking poles) for ambulation longer distances. Instructed in slow position changes secondary to spasticity being greater with quick movements.  -Did provide additional information for adaptive equipment and tools (built-up utensils,  button hook, zipper pull, adaptive jar opener, foam for writing utensils, shower chair, mobility devices), which he is currently declining, and let him know that most equipment could be provided to him through the [employer] pool.      Response to treatment/recommendations: Patient verbalized understanding of all information provided.    Goal attainment:  All goals met    Total Evaluation Time (Minutes): 16  Timed Code Treatment Minutes: 17  Total Treatment Time (sum of timed and untimed services): 33    Assessment & Plan   CLINICAL IMPRESSIONS  Medical Diagnosis: ALS    Treatment Diagnosis: Force Production Deficit   Impression/Assessment: Patient is a 59 year old male with complaints of progressive weakness, cramping, and falls.  The following significant findings have been identified: Decreased ROM/flexibility, Decreased strength, Impaired balance, Impaired gait, Impaired muscle performance, and Decreased activity tolerance. These impairments interfere with their ability to perform self care tasks, recreational activities, household mobility, and community mobility as compared to previous level of function.     Clinical Decision Making (Complexity):  Clinical Presentation: Evolving/Changing  Clinical Presentation Rationale: based on medical and personal factors listed in PT evaluation  Clinical Decision Making (Complexity): Moderate complexity    PLAN OF CARE  Treatment Interventions:  Interventions: Self-Care/Home Management    Long Term Goals     PT Goal 1  Goal Identifier: Understand evaluation  Goal Description: Patient, family and/or caregiver will verbalize understanding of evaluation results and implications for functional performance.  Target Date: 11/14/24  Date Met: 11/14/24  PT Goal 2  Goal Identifier: Compensatory methods/equipment  Goal Description: Patient, family and/or caregiver will verbalize/demonstrate understanding of compensatory methods/equipment to enhance functional independence and  safety  Target Date: 11/14/24  Date Met: 11/14/24  PT Goal 3  Goal Identifier: Positioning/equipment  Goal Description: Patient, family and/or caregiver will verbalize/demonstrate understanding of positioning techniques/equipment  Target Date: 11/14/24  Date Met: 11/14/24  PT Goal 4  Goal Identifier: HEP  Goal Description: Patient, family and/or caregiver will verbalize/demonstrate understanding of home program  Target Date: 11/14/24  Date Met: 11/14/24  PT Goal 5  Goal Identifier: Energy Management  Goal Description: Patient, family and/or caregiver will verbalize energy management techniques appropriate for satus and setting  Target Date: 11/14/24  Date Met: 11/14/24      Frequency of Treatment: 1 time  Duration of Treatment: 1 day    Recommended Referrals to Other Professionals:   Education Assessment:   Learner/Method: Patient;Caregiver;Listening;Pictures/Video    Risks and benefits of evaluation/treatment have been explained.   Patient/Family/caregiver agrees with Plan of Care.     Evaluation Time:     PT Eval, Moderate Complexity Minutes (78696): 16     Signing Clinician: Yesica Stewart, PT

## 2024-11-14 NOTE — NURSING NOTE
"Chief Complaint   Patient presents with    RECHECK     BP (!) 163/106 (BP Location: Left arm, Patient Position: Sitting, Cuff Size: Adult Large)   Pulse 78   Resp 16   Ht 1.755 m (5' 9.09\")   Wt 94.1 kg (207 lb 8 oz)   SpO2 97%   BMI 30.56 kg/m      PHILIP LARA    "

## 2024-11-14 NOTE — PROGRESS NOTES
Return visit for 59 year old year old male with ALS. He presents today after initial diagnostic visit to meet with our multidisciplinary team.    Today's neuromuscular examination (abbreviated):            NM Exam: Upper Extremities        Right Left   Atrophy:     Fasciculations:     Muscle tone:     Rapid alt. movements:       Reflexes Right Left   Biceps: increased increased   Brachioradialis:     Triceps:     Golden:       Strength Right Left   * Indicates a recommended field     Shoulder abduction*: 5 5   Elbow flexion:     Elbow extension*:     Wrist extension*:     Finger extension: 4 4   Finger flexion:     Abductor pollicis brevis:     First dorsal interosseous*:     Abductor digiti minimi:     Comment:       NM Exam: Lower Extremities        Right Left   Atrophy:     Fasciculations:     Muscle tone:     Rapid alt. movements:       Reflexes Right Left   Patellar:     Achilles:     Plantar:       Strength Right Left   * Indicates a recommended field     Hip flexion*: 4+ 4+   Hip extension:     Hip adduction:     Hip abduction:     Knee extension*:     Knee flexion:     Ankle dorsiflexion*:     Ankle plantar flexion:     Ankle inversion:     Ankle eversion:     Toe extension:     Toe flexion:       Normal gait: no   Comment:                   Today's functional outcome measures and pulmonary function studies:    ALSFRS-R Total Score  41    FVC    Lab Results   Component Value Date    20002 4.81 10/30/2024          Summary of today's multidisciplinary clinic visit:    ALS disease modifying medications: has not started riluzole; declines radicava for now  Mobility: see PT note  ADLs: see OT note  Ventilation: normal PFT  Communication: no concerns at present but will check speaking rate  Swallowing and nutrition: no concerns at present  Secretion and saliva management: no concerns  ALS research: met today; candidate for interventional and observational studies; enrolled in ALS [employer] and given COMBAT  "ICF   Genetic testing: done today  Advance care planning:  Cognitive symptoms: none  Behavioral health concerns: discussed \"[place]\" but open, no concerns  Pain: no concerns    Patient instructions:  We discussed the rationale for starting Riluzole  Please check your blood pressure and follow up with Tayler Carr if it remains higher than 130/70  Go to the lab when we are done for your blood tests  Return visit 12:00 January 9  PT recommendations:       Frank Nesbitt M.D.      50 minutes spent on the date of the encounter on chart review, history and examination, documentation and further activities as noted above.     The longitudinal plan of care for ALS as documented were addressed during this visit. Due to the added complexity in care, I will continue to support Burak in the subsequent management and with ongoing continuity of care.        "

## 2024-11-15 LAB
Lab: NORMAL
PERFORMING LABORATORY: NORMAL
SCANNED LAB RESULT: NORMAL
SPECIMEN STATUS: NORMAL
TEST NAME: NORMAL
TEST NAME: NORMAL

## 2024-11-15 NOTE — PROGRESS NOTES
Social Work -ALS Clinic Progress Note  [facility]    Data/Intervention:    Patient Name:  Burak Cruz  /Age:  1965 (59 year old)    Visit Type: in person    Collaborated With:    -Omar and his x-delvin Amita  -Dr Nesbitt and members of the ALS interdisciplinary team    Psychosocial info/Concerns:   Spoke with Omar and Amita a few weeks ago and met with them to check in today.   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 He is hoping that he can work again in the spring. Discussed how that complicates getting SSDI and Amita is concerned that he doesn't loose that income. He pays himself from the business and so that can be adjusted. She is asking good questions and they need help with determining the way ahead from experts. She has soc sec  info but also doesn't want to spend that money. Provided her with info about the [facility] that has an ALS program to advocate and help people gain access to financial and other resources so she will reach out to them.  Re insurance. She has info re a [employer]  to help him sign up for an insurance plan thru [employer] until he is eligible for medicare. [employer] insurance will start in . He is paying out of pocket for current care here, out of his savings.    Omar discussed that he needs to continue to work for his own mental health and wellbeing and he's not in a place yet to give that up. Discussed that everyone has different ways of coping with having ALS and he will do it his own way which is ok. He appreciated that validation. He also discussed how he has lived a good life.     Intervention/Education/Resources Provided:  Provided information, resources and support. Amita is helping him with the financial and insurance pieces.   He is working on a health care directive.    Assessment/Plan:  Encouraged them to reach out to me anytime. Will continue to follow in ALS clinic.     Provided  patient/family with contact information and encouraged them to contact me between clinic visits as needed.     DANIEL Wright, [facility]    [facility]  [phone]

## 2024-12-05 LAB
SCANNED LAB RESULT: NORMAL
TEST NAME: NORMAL

## 2024-12-09 LAB
SCANNED LAB RESULT: NORMAL
TEST NAME: NORMAL

## 2024-12-10 ENCOUNTER — TELEPHONE (OUTPATIENT)
Dept: CONSULT | Facility: CLINIC | Age: 59
End: 2024-12-10

## 2024-12-10 NOTE — TELEPHONE ENCOUNTER
Called Burak and reviewed the following information related to his genetic test results:    Our genes are sequences of letters that provide instructions that help our body grow, develop and function. Sometimes a change occurs in one of our genes that causes the body to be unable to read these instructions. This results in a genetic condition.    ALS genetic testing consists of two parts. The first is testing to determine the number of repeats in genes called [id number] and ATXN2 and the second is sequencing and deletion/duplication analysis of a panel of genes related to ALS to determine if changes are present.     Burak's genetic testing looked at a gene called [id number] to determine if their is an expanded number of repeats present. This testing was negative or normal. This test identified 2 and 8 repeats (less that 24 is considered normal).     Burak's genetic testing looked at a gene called ATXN2 to determine if their is an expanded number of repeats present. This testing was negative or normal. This test identified 22 and 23 repeats (less that 31 is considered normal).     Burak's ALS panel looked for changes in multiple genes related to ALS. This testing was also negative or normal.     Negative genetic testing does not rule out or change a clinical diagnosis of ALS. It is likely that there are single gene causes of ALS that have not been identified and are not tested for with today's technology.     Burak expressed an excellent understanding of this information. All questions were answered.    Brenda Galvan [facility]  Genetic Counselor  Division of Genetics and Metabolism  (p) [phone]  (f) [phone]

## 2024-12-15 ENCOUNTER — HEALTH MAINTENANCE LETTER (OUTPATIENT)
Age: 59
End: 2024-12-15

## 2025-02-19 ENCOUNTER — PATIENT OUTREACH (OUTPATIENT)
Dept: NEUROLOGY | Facility: CLINIC | Age: 60
End: 2025-02-19

## 2025-02-19 NOTE — TELEPHONE ENCOUNTER
Follow-up with patient as he was last seen 11/2024 and cancelled follow-up appt.  Pt indicates his symptoms have not changed/worsened and feels the Tegretol he was prescribed is the reason his cramping/twitching has all but ceased.  Pt is not interested in scheduling follow-up nor participating in research at this time and will contact the clinic when he wants to schedule.